# Patient Record
Sex: FEMALE | Race: WHITE | NOT HISPANIC OR LATINO | Employment: OTHER | URBAN - METROPOLITAN AREA
[De-identification: names, ages, dates, MRNs, and addresses within clinical notes are randomized per-mention and may not be internally consistent; named-entity substitution may affect disease eponyms.]

---

## 2018-08-17 ENCOUNTER — OFFICE VISIT (OUTPATIENT)
Dept: OPHTHALMOLOGY | Facility: CLINIC | Age: 73
End: 2018-08-17
Payer: MEDICARE

## 2018-08-17 DIAGNOSIS — H52.7 REFRACTION DISORDER: ICD-10-CM

## 2018-08-17 DIAGNOSIS — H25.011 CORTICAL SENILE CATARACT OF RIGHT EYE: ICD-10-CM

## 2018-08-17 DIAGNOSIS — H02.401 PTOSIS OF RIGHT EYELID: ICD-10-CM

## 2018-08-17 DIAGNOSIS — H11.153 PINGUECULA OF BOTH EYES: ICD-10-CM

## 2018-08-17 DIAGNOSIS — Z83.511 FAMILY HISTORY OF OPEN-ANGLE GLAUCOMA: ICD-10-CM

## 2018-08-17 DIAGNOSIS — H25.012 CORTICAL SENILE CATARACT OF LEFT EYE: Primary | ICD-10-CM

## 2018-08-17 PROCEDURE — 99999 PR PBB SHADOW E&M-NEW PATIENT-LVL II: CPT | Mod: PBBFAC,,, | Performed by: OPHTHALMOLOGY

## 2018-08-17 PROCEDURE — 92015 DETERMINE REFRACTIVE STATE: CPT | Mod: S$GLB,,, | Performed by: OPHTHALMOLOGY

## 2018-08-17 PROCEDURE — 92004 COMPRE OPH EXAM NEW PT 1/>: CPT | Mod: S$GLB,,, | Performed by: OPHTHALMOLOGY

## 2018-08-17 RX ORDER — LOVASTATIN 40 MG/1
40 TABLET ORAL NIGHTLY
COMMUNITY
Start: 2018-07-24

## 2018-08-17 RX ORDER — UBIDECARENONE 30 MG
30 CAPSULE ORAL 3 TIMES DAILY
COMMUNITY

## 2018-08-17 RX ORDER — OXYBUTYNIN CHLORIDE 5 MG/1
TABLET ORAL
COMMUNITY
Start: 2018-07-24

## 2018-08-17 RX ORDER — TRAZODONE HYDROCHLORIDE 50 MG/1
50 TABLET ORAL
COMMUNITY
Start: 2018-06-01

## 2018-08-17 RX ORDER — PANTOPRAZOLE SODIUM 40 MG/1
40 TABLET, DELAYED RELEASE ORAL
COMMUNITY
Start: 2018-06-01 | End: 2019-06-01

## 2018-08-17 RX ORDER — BENAZEPRIL/HYDROCHLOROTHIAZIDE 20 MG-25MG
1 TABLET ORAL
COMMUNITY
Start: 2018-06-01

## 2018-08-17 RX ORDER — MELOXICAM 15 MG/1
15 TABLET ORAL
COMMUNITY
Start: 2018-06-01 | End: 2019-06-01

## 2018-08-17 NOTE — PROGRESS NOTES
HPI     Patient is here to establish care , patient's last eye exam was approx.   4-5 years ago, patient c/o droopy lids interfere with her vision and   patient states she has great difficulty reading due to blurred vision,   patiet states she only drives at night if she has to due to difficulty   with oncoming headlight glare.        NP- REFERRED BY MOM -RAND LAYTON CL # [id number]  +FAM HX-GLAUCOMA-MOM AND BRO    Last edited by ILSA Delgado on 8/17/2018  9:51 AM. (History)            Assessment /Plan     For exam results, see Encounter Report.      ICD-10-CM ICD-9-CM    1. Cortical senile cataract of left eye H25.012 366.15   You were found to have an early cataract in your eye(s) today, however the cataract is not affecting your activities of daily living, such as reading and driving.You do not need  surgery at this time. We will recheck your cataract at your next visit. You are welcome to call for an earlier appointment if your vision gets worse.      2. Cortical senile cataract of right eye H25.011 366.15 Above    3. Pinguecula of both eyes H11.153 372.51 Stable, will follow   4. Family history of open-angle glaucoma Z83.511 V19.11 Normal GOCT    5. 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 Ptosis-right eyelid. Follow     Return to clinic 1 year

## 2019-08-23 ENCOUNTER — OFFICE VISIT (OUTPATIENT)
Dept: OPHTHALMOLOGY | Facility: CLINIC | Age: 74
End: 2019-08-23
Payer: MEDICARE

## 2019-08-23 DIAGNOSIS — Z83.511 FAMILY HISTORY OF OPEN-ANGLE GLAUCOMA: ICD-10-CM

## 2019-08-23 DIAGNOSIS — H25.011 CORTICAL SENILE CATARACT OF RIGHT EYE: ICD-10-CM

## 2019-08-23 DIAGNOSIS — H11.153 PINGUECULA OF BOTH EYES: ICD-10-CM

## 2019-08-23 DIAGNOSIS — H25.012 CORTICAL SENILE CATARACT OF LEFT EYE: Primary | ICD-10-CM

## 2019-08-23 DIAGNOSIS — H02.401 PTOSIS OF RIGHT EYELID: ICD-10-CM

## 2019-08-23 PROCEDURE — 99999 PR PBB SHADOW E&M-EST. PATIENT-LVL I: ICD-10-PCS | Mod: PBBFAC,,, | Performed by: OPHTHALMOLOGY

## 2019-08-23 PROCEDURE — 99999 PR PBB SHADOW E&M-EST. PATIENT-LVL I: CPT | Mod: PBBFAC,,, | Performed by: OPHTHALMOLOGY

## 2019-08-23 PROCEDURE — 92014 PR EYE EXAM, EST PATIENT,COMPREHESV: ICD-10-PCS | Mod: S$GLB,,, | Performed by: OPHTHALMOLOGY

## 2019-08-23 PROCEDURE — 92014 COMPRE OPH EXAM EST PT 1/>: CPT | Mod: S$GLB,,, | Performed by: OPHTHALMOLOGY

## 2019-08-23 RX ORDER — HYDROCORTISONE 25 MG/G
CREAM TOPICAL
COMMUNITY
Start: 2019-06-09

## 2019-08-23 RX ORDER — ONDANSETRON 4 MG/1
TABLET, FILM COATED ORAL
COMMUNITY
Start: 2018-06-21

## 2019-08-23 RX ORDER — ACETAMINOPHEN 500 MG
6000 TABLET ORAL
COMMUNITY

## 2019-08-23 RX ORDER — MELOXICAM 15 MG/1
15 TABLET ORAL
COMMUNITY
Start: 2019-07-01

## 2019-08-23 RX ORDER — BENZONATATE 100 MG/1
100 CAPSULE ORAL 3 TIMES DAILY PRN
COMMUNITY
Start: 2019-07-29

## 2019-08-23 RX ORDER — ASPIRIN 81 MG/1
81 TABLET ORAL
COMMUNITY
End: 2024-03-06

## 2019-08-23 RX ORDER — MULTIVITAMIN
1 TABLET ORAL
COMMUNITY

## 2019-08-23 RX ORDER — LOSARTAN POTASSIUM AND HYDROCHLOROTHIAZIDE 25; 100 MG/1; MG/1
1 TABLET ORAL
COMMUNITY
Start: 2019-07-01

## 2019-08-23 NOTE — PROGRESS NOTES
HPI     Pt here for annual exam. No pain or discomfort. VA stable. Pt states she   is happy with her glasses and doesn't need a new Rx.    NP- REFERRED BY MOM -RAND LAYTON CL # [id number]  +FAM HX-GLAUCOMA-MOM AND BRO    Last edited by Neil Kline, Patient Care Assistant on 8/23/2019 10:36   AM. (History)            Assessment /Plan     For exam results, see Encounter Report.      ICD-10-CM ICD-9-CM    1. Cortical senile cataract of left eye H25.012 366.15   You were found to have an early cataract in your eye(s) today, however the cataract is not affecting your activities of daily living, such as reading and driving.You do not need  surgery at this time. We will recheck your cataract at your next visit. You are welcome to call for an earlier appointment if your vision gets worse.      2. Cortical senile cataract of right eye H25.011 366.15 above   3. 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 Family history of open-angle glaucoma Z83.511 V19.11 Glaucoma risk level is unchanged at this time and patient will continued to be followed.      5. Ptosis of right eyelid H02.401 374.30 Stable, follow       Return to clinic 1 year

## 2020-12-11 ENCOUNTER — OFFICE VISIT (OUTPATIENT)
Dept: OPHTHALMOLOGY | Facility: CLINIC | Age: 75
End: 2020-12-11
Payer: MEDICARE

## 2020-12-11 DIAGNOSIS — H11.153 PINGUECULA OF BOTH EYES: ICD-10-CM

## 2020-12-11 DIAGNOSIS — H25.011 CORTICAL SENILE CATARACT OF RIGHT EYE: ICD-10-CM

## 2020-12-11 DIAGNOSIS — H02.401 PTOSIS OF RIGHT EYELID: ICD-10-CM

## 2020-12-11 DIAGNOSIS — H25.012 CORTICAL SENILE CATARACT OF LEFT EYE: Primary | ICD-10-CM

## 2020-12-11 DIAGNOSIS — H52.7 REFRACTION DISORDER: ICD-10-CM

## 2020-12-11 DIAGNOSIS — Z83.511 FAMILY HISTORY OF OPEN-ANGLE GLAUCOMA: ICD-10-CM

## 2020-12-11 PROCEDURE — 92014 COMPRE OPH EXAM EST PT 1/>: CPT | Mod: S$GLB,,, | Performed by: OPHTHALMOLOGY

## 2020-12-11 PROCEDURE — 92015 DETERMINE REFRACTIVE STATE: CPT | Mod: S$GLB,,, | Performed by: OPHTHALMOLOGY

## 2020-12-11 PROCEDURE — 92015 PR REFRACTION: ICD-10-PCS | Mod: S$GLB,,, | Performed by: OPHTHALMOLOGY

## 2020-12-11 PROCEDURE — 99999 PR PBB SHADOW E&M-EST. PATIENT-LVL III: ICD-10-PCS | Mod: PBBFAC,,, | Performed by: OPHTHALMOLOGY

## 2020-12-11 PROCEDURE — 99999 PR PBB SHADOW E&M-EST. PATIENT-LVL III: CPT | Mod: PBBFAC,,, | Performed by: OPHTHALMOLOGY

## 2020-12-11 PROCEDURE — 92014 PR EYE EXAM, EST PATIENT,COMPREHESV: ICD-10-PCS | Mod: S$GLB,,, | Performed by: OPHTHALMOLOGY

## 2020-12-11 NOTE — PROGRESS NOTES
HPI     Annual Exam     Comments: Complete ocular exam              Comments     Patient states her puppy got her most current glasses and is using an old   pair, believes her glasses were doing fine before they broke.      1. FAM HX-GLAUCOMA-MOM AND BRO  2. Cataract  3. Pinguecula  4. Ptosis          Last edited by Stephanie Wiseman, Patient Care Assistant on 12/11/2020 10:40   AM. (History)            Assessment /Plan     For exam results, see Encounter Report.      ICD-10-CM ICD-9-CM    1. Cortical senile cataract of left eye  H25.012 366.15   You were found to have an early cataract in your eye(s) today, however the cataract is not affecting your activities of daily living, such as reading and driving.You do not need  surgery at this time. We will recheck your cataract at your next visit. You are welcome to call for an earlier appointment if your vision gets worse.      2. Cortical senile cataract of right eye  H25.011 366.15 As above   3. Family history of open-angle glaucoma  Z83.511 V19.11 follow   4. Pinguecula of both eyes  H11.153 372.51    5. Ptosis of right eyelid  H02.401 374.30 follow       Disp Glasses  Return to clinic 1 year

## 2021-12-17 ENCOUNTER — OFFICE VISIT (OUTPATIENT)
Dept: OPHTHALMOLOGY | Facility: CLINIC | Age: 76
End: 2021-12-17
Payer: MEDICARE

## 2021-12-17 DIAGNOSIS — H11.153 PINGUECULA OF BOTH EYES: ICD-10-CM

## 2021-12-17 DIAGNOSIS — Z83.511 FAMILY HISTORY OF OPEN-ANGLE GLAUCOMA: ICD-10-CM

## 2021-12-17 DIAGNOSIS — H52.7 REFRACTIVE ERROR: ICD-10-CM

## 2021-12-17 DIAGNOSIS — H25.011 CORTICAL SENILE CATARACT OF RIGHT EYE: ICD-10-CM

## 2021-12-17 DIAGNOSIS — H25.012 CORTICAL SENILE CATARACT OF LEFT EYE: ICD-10-CM

## 2021-12-17 DIAGNOSIS — H40.013 OPEN ANGLE WITH BORDERLINE FINDINGS, LOW RISK, BILATERAL: Primary | ICD-10-CM

## 2021-12-17 PROCEDURE — 92014 COMPRE OPH EXAM EST PT 1/>: CPT | Mod: S$GLB,,, | Performed by: OPHTHALMOLOGY

## 2021-12-17 PROCEDURE — 99999 PR PBB SHADOW E&M-EST. PATIENT-LVL I: ICD-10-PCS | Mod: PBBFAC,,, | Performed by: OPHTHALMOLOGY

## 2021-12-17 PROCEDURE — 92014 PR EYE EXAM, EST PATIENT,COMPREHESV: ICD-10-PCS | Mod: S$GLB,,, | Performed by: OPHTHALMOLOGY

## 2021-12-17 PROCEDURE — 92015 PR REFRACTION: ICD-10-PCS | Mod: S$GLB,,, | Performed by: OPHTHALMOLOGY

## 2021-12-17 PROCEDURE — 92133 POSTERIOR SEGMENT OCT OPTIC NERVE(OCULAR COHERENCE TOMOGRAPHY) - OU - BOTH EYES: ICD-10-PCS | Mod: S$GLB,,, | Performed by: OPHTHALMOLOGY

## 2021-12-17 PROCEDURE — 92133 CPTRZD OPH DX IMG PST SGM ON: CPT | Mod: S$GLB,,, | Performed by: OPHTHALMOLOGY

## 2021-12-17 PROCEDURE — 92015 DETERMINE REFRACTIVE STATE: CPT | Mod: S$GLB,,, | Performed by: OPHTHALMOLOGY

## 2021-12-17 PROCEDURE — 99999 PR PBB SHADOW E&M-EST. PATIENT-LVL I: CPT | Mod: PBBFAC,,, | Performed by: OPHTHALMOLOGY

## 2023-01-23 ENCOUNTER — OFFICE VISIT (OUTPATIENT)
Dept: OPHTHALMOLOGY | Facility: CLINIC | Age: 78
End: 2023-01-23
Payer: MEDICARE

## 2023-01-23 DIAGNOSIS — H25.12 NUCLEAR SCLEROSIS OF LEFT EYE: ICD-10-CM

## 2023-01-23 DIAGNOSIS — H40.013 OPEN ANGLE WITH BORDERLINE FINDINGS, LOW RISK, BILATERAL: ICD-10-CM

## 2023-01-23 DIAGNOSIS — H52.7 REFRACTIVE ERROR: ICD-10-CM

## 2023-01-23 DIAGNOSIS — H25.11 NUCLEAR SCLEROSIS OF RIGHT EYE: Primary | ICD-10-CM

## 2023-01-23 DIAGNOSIS — H11.153 PINGUECULA OF BOTH EYES: ICD-10-CM

## 2023-01-23 PROCEDURE — 1160F RVW MEDS BY RX/DR IN RCRD: CPT | Mod: CPTII,S$GLB,, | Performed by: STUDENT IN AN ORGANIZED HEALTH CARE EDUCATION/TRAINING PROGRAM

## 2023-01-23 PROCEDURE — 1159F PR MEDICATION LIST DOCUMENTED IN MEDICAL RECORD: ICD-10-PCS | Mod: CPTII,S$GLB,, | Performed by: STUDENT IN AN ORGANIZED HEALTH CARE EDUCATION/TRAINING PROGRAM

## 2023-01-23 PROCEDURE — 99999 PR PBB SHADOW E&M-EST. PATIENT-LVL III: ICD-10-PCS | Mod: PBBFAC,,, | Performed by: STUDENT IN AN ORGANIZED HEALTH CARE EDUCATION/TRAINING PROGRAM

## 2023-01-23 PROCEDURE — 92015 DETERMINE REFRACTIVE STATE: CPT | Mod: S$GLB,,, | Performed by: STUDENT IN AN ORGANIZED HEALTH CARE EDUCATION/TRAINING PROGRAM

## 2023-01-23 PROCEDURE — 99999 PR PBB SHADOW E&M-EST. PATIENT-LVL III: CPT | Mod: PBBFAC,,, | Performed by: STUDENT IN AN ORGANIZED HEALTH CARE EDUCATION/TRAINING PROGRAM

## 2023-01-23 PROCEDURE — 1159F MED LIST DOCD IN RCRD: CPT | Mod: CPTII,S$GLB,, | Performed by: STUDENT IN AN ORGANIZED HEALTH CARE EDUCATION/TRAINING PROGRAM

## 2023-01-23 PROCEDURE — 99214 PR OFFICE/OUTPT VISIT, EST, LEVL IV, 30-39 MIN: ICD-10-PCS | Mod: S$GLB,,, | Performed by: STUDENT IN AN ORGANIZED HEALTH CARE EDUCATION/TRAINING PROGRAM

## 2023-01-23 PROCEDURE — 92133 POSTERIOR SEGMENT OCT OPTIC NERVE(OCULAR COHERENCE TOMOGRAPHY) - OU - BOTH EYES: ICD-10-PCS | Mod: S$GLB,,, | Performed by: STUDENT IN AN ORGANIZED HEALTH CARE EDUCATION/TRAINING PROGRAM

## 2023-01-23 PROCEDURE — 92015 PR REFRACTION: ICD-10-PCS | Mod: S$GLB,,, | Performed by: STUDENT IN AN ORGANIZED HEALTH CARE EDUCATION/TRAINING PROGRAM

## 2023-01-23 PROCEDURE — 99214 OFFICE O/P EST MOD 30 MIN: CPT | Mod: S$GLB,,, | Performed by: STUDENT IN AN ORGANIZED HEALTH CARE EDUCATION/TRAINING PROGRAM

## 2023-01-23 PROCEDURE — 92133 CPTRZD OPH DX IMG PST SGM ON: CPT | Mod: S$GLB,,, | Performed by: STUDENT IN AN ORGANIZED HEALTH CARE EDUCATION/TRAINING PROGRAM

## 2023-01-23 PROCEDURE — 1160F PR REVIEW ALL MEDS BY PRESCRIBER/CLIN PHARMACIST DOCUMENTED: ICD-10-PCS | Mod: CPTII,S$GLB,, | Performed by: STUDENT IN AN ORGANIZED HEALTH CARE EDUCATION/TRAINING PROGRAM

## 2023-01-23 NOTE — PROGRESS NOTES
HPI    Pt in today for a GOCT and dilation. Pt states her vision has been doing   ok. Pt denies any ocular pain or discomfort. Not using any drops at this   time.  Last edited by Cris Guajardo on 1/23/2023  1:28 PM.            Assessment /Plan     For exam results, see Encounter Report.    Nuclear sclerosis of right eye- - VS. Patient wishes to wait for surgery.  - monitor     Nuclear sclerosis of left eye    Open angle with borderline findings, low risk, bilateral  -  Risk factors: +Fhx  Will obtain GOCT annually    Explained that the diagnosis is uncertain and further testing is indicated. Discussed importance of follow up and completion of indicated studies as well as the risk of blindness from untreated/undiagnosed glaucoma.      Pinguecula of both eyes- Follow    Refractive error- Mrx dispensed      Return to clinic in 1 year or PRN w/ GOCT DFE

## 2023-06-15 ENCOUNTER — HOSPITAL ENCOUNTER (OUTPATIENT)
Dept: RADIOLOGY | Facility: HOSPITAL | Age: 78
Discharge: HOME | End: 2023-06-15
Payer: MEDICARE

## 2023-06-15 DIAGNOSIS — K62.3 RECTAL PROLAPSE: ICD-10-CM

## 2023-06-15 DIAGNOSIS — N81.89 OLD LACERATION OF MUSCLES OF PELVIC FLOOR: ICD-10-CM

## 2023-06-15 PROCEDURE — 74270 FL DEFECOGRAM: ICD-10-PCS | Mod: 26,,, | Performed by: STUDENT IN AN ORGANIZED HEALTH CARE EDUCATION/TRAINING PROGRAM

## 2023-06-15 PROCEDURE — 25500020 PHARM REV CODE 255

## 2023-06-15 PROCEDURE — 74270 X-RAY XM COLON 1CNTRST STD: CPT | Mod: TC

## 2023-06-15 PROCEDURE — 74270 X-RAY XM COLON 1CNTRST STD: CPT | Mod: 26,,, | Performed by: STUDENT IN AN ORGANIZED HEALTH CARE EDUCATION/TRAINING PROGRAM

## 2023-06-15 PROCEDURE — A9698 NON-RAD CONTRAST MATERIALNOC: HCPCS

## 2023-06-15 RX ADMIN — BARIUM SULFATE 60 ML: 0.6 SUSPENSION ORAL at 10:06

## 2023-06-15 RX ADMIN — BARIUM SULFATE 454 G: 0.6 CREAM ORAL at 10:06

## 2023-12-18 ENCOUNTER — TELEPHONE (OUTPATIENT)
Dept: OPHTHALMOLOGY | Facility: CLINIC | Age: 78
End: 2023-12-18
Payer: MEDICARE

## 2023-12-18 NOTE — TELEPHONE ENCOUNTER
Called pt, she would like to schedule appt for [place] with Dr. Blakely for her  yearly  Cataract recheck,  believes she is ready to schedule her surgery. Explained to the pt  I would get with Dr SABILLON /nurse staff to call her and get her scheduled for that location. SRB  ----- Message from Andra Browning sent at 12/18/2023  2:49 PM CST -----  [phone]   Kalyn is calling regarding future surgery for cataract would like to schedule cons

## 2023-12-18 NOTE — TELEPHONE ENCOUNTER
----- Message from Andra Browning sent at 12/18/2023  3:22 PM CST -----  Nurse staff schedule cataract cons   ----- Message -----  From: Kaila Hood  Sent: 12/18/2023   3:15 PM CST  To: [facility] Scheduling      ----- Message -----  From: Andra Browning  Sent: 12/18/2023   2:50 PM CST  To: [facility] Staff; #    [phone]   Kalyn is calling regarding future surgery for cataract would like to schedule cons

## 2023-12-20 ENCOUNTER — TELEPHONE (OUTPATIENT)
Dept: OPHTHALMOLOGY | Facility: CLINIC | Age: 78
End: 2023-12-20
Payer: MEDICARE

## 2024-01-24 ENCOUNTER — TELEPHONE (OUTPATIENT)
Dept: OPHTHALMOLOGY | Facility: CLINIC | Age: 79
End: 2024-01-24
Payer: MEDICARE

## 2024-01-30 ENCOUNTER — OFFICE VISIT (OUTPATIENT)
Dept: OPHTHALMOLOGY | Facility: CLINIC | Age: 79
End: 2024-01-30
Payer: MEDICARE

## 2024-01-30 DIAGNOSIS — H11.153 PINGUECULA OF BOTH EYES: ICD-10-CM

## 2024-01-30 DIAGNOSIS — H40.013 OPEN ANGLE WITH BORDERLINE FINDINGS, LOW RISK, BILATERAL: ICD-10-CM

## 2024-01-30 DIAGNOSIS — H25.813 COMBINED FORM OF AGE-RELATED CATARACT, BOTH EYES: Primary | ICD-10-CM

## 2024-01-30 PROCEDURE — 92133 CPTRZD OPH DX IMG PST SGM ON: CPT | Mod: S$GLB,,, | Performed by: OPTOMETRIST

## 2024-01-30 PROCEDURE — 99999 PR PBB SHADOW E&M-EST. PATIENT-LVL III: CPT | Mod: PBBFAC,,, | Performed by: OPTOMETRIST

## 2024-01-30 PROCEDURE — 92004 COMPRE OPH EXAM NEW PT 1/>: CPT | Mod: S$GLB,,, | Performed by: OPTOMETRIST

## 2024-01-30 PROCEDURE — 1159F MED LIST DOCD IN RCRD: CPT | Mod: CPTII,S$GLB,, | Performed by: OPTOMETRIST

## 2024-01-30 NOTE — PROGRESS NOTES
HPI    1.NS OU  2. OAG findings, low risk  REFERRED BY KATT -RAND LAYTON CL # [id number].   3. FAM HX-GLAUCOMA-MOM AND BRO  4. Cataract  5. Pinguecula  6. Ptosis    Vision changes since last eye exam?: Pt is experiencing worsening near   vision when reading.      Any eye pain today: No.    Other ocular symptoms: Pt experiences occasional headaches. Photophobia.    Interested in contact lens fitting today? No.     Last edited by Lia Alonzo on 1/30/2024  2:33 PM.            Assessment /Plan     For exam results, see Encounter Report.    Combined form of age-related cataract, both eyes  -     Ambulatory referral/consult to Ophthalmology; Future; Expected date: 02/06/2024  Will consult Dr Mahoney for CE/IOL eval.     Open angle with borderline findings, low risk, bilateral  -     OCT, Optic Nerve - OU - Both Eyes  The patient has the following Glaucoma risk factors: Optic Nerve Asymmetry  and Family History    Recommend close observation off drops at this time with annual gOCT for changes.      Pinguecula of both eyes  Stable, observe.    RTC with Dr. Mahoney for CE/IOL, sooner if changes to vision or worsening symptoms.  Discussed above and answered questions.

## 2024-03-06 ENCOUNTER — OFFICE VISIT (OUTPATIENT)
Dept: OPHTHALMOLOGY | Facility: CLINIC | Age: 79
End: 2024-03-06
Payer: MEDICARE

## 2024-03-06 DIAGNOSIS — H40.013 OPEN ANGLE WITH BORDERLINE FINDINGS, LOW RISK, BILATERAL: ICD-10-CM

## 2024-03-06 DIAGNOSIS — H25.813 COMBINED FORM OF AGE-RELATED CATARACT, BOTH EYES: ICD-10-CM

## 2024-03-06 DIAGNOSIS — H25.12 NUCLEAR SCLEROSIS OF LEFT EYE: Primary | ICD-10-CM

## 2024-03-06 DIAGNOSIS — H25.11 NUCLEAR SCLEROSIS OF RIGHT EYE: ICD-10-CM

## 2024-03-06 DIAGNOSIS — H11.153 PINGUECULA OF BOTH EYES: ICD-10-CM

## 2024-03-06 PROCEDURE — 99214 OFFICE O/P EST MOD 30 MIN: CPT | Mod: S$GLB,,, | Performed by: OPHTHALMOLOGY

## 2024-03-06 PROCEDURE — 92136 OPHTHALMIC BIOMETRY: CPT | Mod: LT,S$GLB,, | Performed by: OPHTHALMOLOGY

## 2024-03-06 PROCEDURE — 99999 PR PBB SHADOW E&M-EST. PATIENT-LVL IV: CPT | Mod: PBBFAC,,, | Performed by: OPHTHALMOLOGY

## 2024-03-06 PROCEDURE — 1159F MED LIST DOCD IN RCRD: CPT | Mod: CPTII,S$GLB,, | Performed by: OPHTHALMOLOGY

## 2024-03-06 PROCEDURE — 1160F RVW MEDS BY RX/DR IN RCRD: CPT | Mod: CPTII,S$GLB,, | Performed by: OPHTHALMOLOGY

## 2024-03-06 RX ORDER — PREDNISOLONE ACETATE 10 MG/ML
1 SUSPENSION/ DROPS OPHTHALMIC 4 TIMES DAILY
Qty: 5 ML | Refills: 1 | Status: SHIPPED | OUTPATIENT
Start: 2024-03-06

## 2024-03-06 NOTE — PROGRESS NOTES
HPI     Cataract            Comments: Cataract eval referred by DKT    Patient states VA has decreased at all ranges OS>OD over the last 6   months, images are very hazy and dull and patient no longer drives at   night secondary to headlight glare becoming bothersome.          Comments    1. FAM HX-GLAUCOMA-MOM AND BRO  2. Cataract  3. Pinguecula  4. Ptosis             Last edited by Stephanie Wiseman, Patient Care Assistant on 3/6/2024  8:56   AM.            Assessment /Plan     For exam results, see Encounter Report.      ICD-10-CM ICD-9-CM    1. Nuclear sclerosis of left eye  H25.12 366.16 Visually Significant Cataract OS > OD  Patient reports decreased vision in the fellow eye consistent with the clinical amount of lenticular opacity, which reaches the level of visual significance and affects activities of daily living including reading and glare. Risks, benefits, and alternatives to cataract surgery were discussed and patient desired to schedule cataract surgery. Patient was consented and the biometry and lens options were reviewed.    Discussed IOL options and refractive outcomes for this patient.    Topography Reviewed: Yes  History of Refractive Surgery: No     Phaco left eye, +24.0 SY60WF  Block  Monofocal - Distance  Prescriptions sent for preoperative medications  Prednisolone Acetate- 4xs daily   Anticoagulant status: None      Explained that patient may need glasses after surgery.  Discussed that vision may be limited by: None      Referral to [facility] for Ophthalmic surgery      **defers MF implant**      Schedule post op visit #2 with MGM      2. Nuclear sclerosis of right eye  H25.11 366.16 Monitor at this time      3. Open angle with borderline findings, low risk, bilateral  H40.013 365.01 Glaucoma risk level is unchanged at this time and patient will continued to be followed.       4. Pinguecula of both eyes  H11.153 372.51 Not in visual axis       5. Combined form of age-related  cataract, both eyes  H25.813 366.19 Ambulatory referral/consult to Ophthalmology    See #1

## 2024-03-08 ENCOUNTER — DOCUMENTATION ONLY (OUTPATIENT)
Dept: OPHTHALMOLOGY | Facility: CLINIC | Age: 79
End: 2024-03-08
Payer: MEDICARE

## 2024-03-08 NOTE — PROGRESS NOTES
Short Stay Record    CC: Blurry Vision     Impression: Visually significant cataract with reasonable expectation for visual improvement Left Eye    External Exam  Last IOP 13/12   Right Left   External Ptosis, dermatochalasis Ptosis, dermatochalasis     Slit Lamp Exam     Right Left   Lids/Lashes Normal Normal   Conjunctiva/Sclera Temporal and Nasal 1+ Pinguecula Temporal and Nasal 1+ Pinguecula   Cornea Clear Clear   Anterior Chamber Deep and quiet Deep and quiet   Iris Round and reactive Round and reactive   Lens 1+ Nuclear sclerosis, 2+ Cortical cataract 1+ Nuclear sclerosis, 3+ Cortical cataract   Vitreous Normal Normal     Fundus Exam     Right Left   Disc Normal Normal   C/D Ratio 0.35 0.45   Macula Normal Normal   Vessels Normal Normal   Periphery Normal Normal       Manifest Refraction     Sphere Cylinder [place] Dist VA   Right +1.25 +1.75 094 20/30   Left +2.25 +1.00 120 20/50       Planned Procedure:   Topography Reviewed: Yes  History of Refractive Surgery: No      Phaco left eye, +24.0 SY60WF  Block  Monofocal - Distance  Prescriptions sent for preoperative medications  Prednisolone Acetate- 4xs daily   Anticoagulant status: None          Lens Selection OS verified _____             Previous IOL OD _n/a____    Patient cleared for ophthalmic surgery.     Discharge Summary:    Admitting Diagnosis: Nuclear sclerotic cataract /   OS    Discharge Diagnosis: Pseudophakia OS    Procedure: Phacoemulsification of cataract with intraocular lens implant OS    Complications: None  Discharge Condition: Stable    Discharge instructions: Follow post-operative discharge instruction sheet given by provider.    Follow-up: Return to clinic next day or as scheduled.       HPI:  Kalyn Jacob is a 78 y.o. female who presents for evaluation prior to ophthalmic surgery, left eye. Patient reports of blurred vision at distance and near with and without correction. Patient reports of glare at night reducing function and safety,  and complains of needed additional light to read.     Past Medical History:   Diagnosis Date    Arthritis     Cataract     Hypertension          Review of patient's allergies indicates:   Allergen Reactions    Atorvastatin     Diphenhydramine hcl     Morphine      Nightmares, can take hydrocodone    Penicillins     Rosuvastatin          Current Outpatient Medications:     benazepril-hydrochlorthiazide (LOTENSIN HCT) 20-25 mg Tab, Take 1 tablet by mouth., Disp: , Rfl:     benzonatate (TESSALON) 100 MG capsule, Take 100 mg by mouth 3 (three) times daily as needed., Disp: , Rfl:     cholecalciferol, vitamin D3, (VITAMIN D3) 2,000 unit Cap, Take 6,000 mg by mouth., Disp: , Rfl:     co-enzyme Q-10 30 mg capsule, Take 30 mg by mouth 3 (three) times daily., Disp: , Rfl:     hydrocortisone (ANUSOL-HC) 2.5 % rectal cream, Place rectally., Disp: , Rfl:     losartan-hydrochlorothiazide 100-25 mg (HYZAAR) 100-25 mg per tablet, Take 1 tablet by mouth., Disp: , Rfl:     lovastatin (MEVACOR) 40 MG tablet, 40 mg every evening., Disp: , Rfl:     meloxicam (MOBIC) 15 MG tablet, Take 15 mg by mouth., Disp: , Rfl:     multivitamin (THERAGRAN) per tablet, Take 1 tablet by mouth., Disp: , Rfl:     ondansetron (ZOFRAN) 4 MG tablet, TAKE 1 TABLET BY MOUTH DAILY AS NEEDED FOR NAUSEA., Disp: , Rfl:     oxybutynin (DITROPAN) 5 MG Tab, TAKE 1 TABLET THREE TIMES DAILY. REPLACES MYRBETRIQ ER 50MG, Disp: , Rfl:     pantoprazole (PROTONIX) 40 MG tablet, Take 40 mg by mouth., Disp: , Rfl:     prednisoLONE acetate (PRED FORTE) 1 % DrpS, Place 1 drop into the left eye 4 (four) times daily., Disp: 5 mL, Rfl: 1    traZODone (DESYREL) 50 MG tablet, Take 50 mg by mouth., Disp: , Rfl:     Review of Systems:  A comprehensive review of systems was negative.    Physical Exam:  General Appearance:    A&Ox3, no distress, appears stated age   Head:    Normocephalic, without obvious abnormality, atraumatic   Eyes:    PERRL, EOM's intact   Back:     Symmetric, no  curvature   Lungs:     Respirations unlabored   Chest Wall:    No tenderness or deformity    Heart:  Abdomen:  Extremities:  Skin:    S1 and S2 present    Soft, non-tender    Extremities normal, atraumatic    Skin color, texture, turgor normal

## 2024-03-25 ENCOUNTER — OUTSIDE PLACE OF SERVICE (OUTPATIENT)
Dept: OPHTHALMOLOGY | Facility: CLINIC | Age: 79
End: 2024-03-25
Payer: MEDICARE

## 2024-03-25 PROCEDURE — 66984 XCAPSL CTRC RMVL W/O ECP: CPT | Mod: LT,,, | Performed by: OPHTHALMOLOGY

## 2024-03-26 ENCOUNTER — OFFICE VISIT (OUTPATIENT)
Dept: OPHTHALMOLOGY | Facility: CLINIC | Age: 79
End: 2024-03-26
Payer: MEDICARE

## 2024-03-26 DIAGNOSIS — Z98.890 POST-OPERATIVE STATE: Primary | ICD-10-CM

## 2024-03-26 DIAGNOSIS — Z98.42 CATARACT EXTRACTION STATUS, LEFT: ICD-10-CM

## 2024-03-26 PROCEDURE — 1159F MED LIST DOCD IN RCRD: CPT | Mod: CPTII,S$GLB,, | Performed by: OPHTHALMOLOGY

## 2024-03-26 PROCEDURE — 1160F RVW MEDS BY RX/DR IN RCRD: CPT | Mod: CPTII,S$GLB,, | Performed by: OPHTHALMOLOGY

## 2024-03-26 PROCEDURE — 99999 PR PBB SHADOW E&M-EST. PATIENT-LVL II: CPT | Mod: PBBFAC,,, | Performed by: OPHTHALMOLOGY

## 2024-03-26 PROCEDURE — 99024 POSTOP FOLLOW-UP VISIT: CPT | Mod: S$GLB,,, | Performed by: OPHTHALMOLOGY

## 2024-03-26 NOTE — PROGRESS NOTES
HPI     Post-op Evaluation            Comments: One day post Phaco OS  Pain 0/10         Last edited by Marcio Mahoney MD on 3/26/2024  8:32 AM.            Assessment /Plan     For exam results, see Encounter Report.      ICD-10-CM ICD-9-CM    1. Post-operative state  Z98.890 V45.89       2. Cataract extraction status, left  Z98.42 V45.61           PO Day 1 S/P Phaco/IOL left eye  Doing well.    Use Prednisolone Acetate- 4xs daily      Reinstructed in importance of absolute compliance with Post-OP instructions including medications, shield at bedtime, and limitation of activities. Follow up appointments in approximately one and six weeks or call immediately for increased pain, redness or vision loss.

## 2024-04-02 ENCOUNTER — OFFICE VISIT (OUTPATIENT)
Dept: OPHTHALMOLOGY | Facility: CLINIC | Age: 79
End: 2024-04-02
Payer: MEDICARE

## 2024-04-02 DIAGNOSIS — Z98.890 POST-OPERATIVE STATE: Primary | ICD-10-CM

## 2024-04-02 DIAGNOSIS — Z98.42 CATARACT EXTRACTION STATUS, LEFT: ICD-10-CM

## 2024-04-02 DIAGNOSIS — H25.11 NUCLEAR SCLEROSIS OF RIGHT EYE: ICD-10-CM

## 2024-04-02 PROCEDURE — 92136 OPHTHALMIC BIOMETRY: CPT | Mod: 26,RT,S$GLB, | Performed by: OPHTHALMOLOGY

## 2024-04-02 PROCEDURE — 1160F RVW MEDS BY RX/DR IN RCRD: CPT | Mod: CPTII,S$GLB,, | Performed by: OPHTHALMOLOGY

## 2024-04-02 PROCEDURE — 99024 POSTOP FOLLOW-UP VISIT: CPT | Mod: S$GLB,,, | Performed by: OPHTHALMOLOGY

## 2024-04-02 PROCEDURE — 99999 PR PBB SHADOW E&M-EST. PATIENT-LVL III: CPT | Mod: PBBFAC,,, | Performed by: OPHTHALMOLOGY

## 2024-04-02 PROCEDURE — 1159F MED LIST DOCD IN RCRD: CPT | Mod: CPTII,S$GLB,, | Performed by: OPHTHALMOLOGY

## 2024-04-02 RX ORDER — PREDNISOLONE ACETATE 10 MG/ML
1 SUSPENSION/ DROPS OPHTHALMIC 4 TIMES DAILY
Qty: 5 ML | Refills: 1 | Status: SHIPPED | OUTPATIENT
Start: 2024-04-02

## 2024-04-02 NOTE — PROGRESS NOTES
HPI     Post-op Evaluation            Comments: PCIOL OS 3/25/24 SY60WF +24.0/ CDE: 12.28    Patient states VA is doing well in OS happy with surgical results.    Patient states VA in OD appears dull and cloudy compared to OS along with   glare sensitivity.            Comments    1. FAM HX-GLAUCOMA-MOM AND BRO  2. PCIOL OS 3/25/24 SY60WF +24.0/ CDE: 12.28  3. Pinguecula OU  4. Ptosis  5. Glaucoma suspect      Pred QID OS             Last edited by Stephanie Wiseman, Patient Care Assistant on 4/2/2024  9:21   AM.            Assessment /Plan     For exam results, see Encounter Report.      ICD-10-CM ICD-9-CM    1. Post-operative state  Z98.890 V45.89 PO Week 1 S/P Phaco/ IOL left eye:     Patient is doing well with no evidence of inflammation. Taper drops every 10 days as follows: 3xs daily x 10 days, 2xs daily x 10 days, 1x daily for 10 days- then discontinue    Resume normal activitites and discontinue eye shield at night  OTC reading glasses can be used until evaluated for final MRx      2. Cataract extraction status, left  Z98.42 V45.61       3. Nuclear sclerosis of right eye  H25.11 366.16 Patient reports decreased vision in the fellow eye consistent with the clinical amount of lenticular opacity, which reaches the level of visual significance and affects activities of daily living including reading and glare. Risks, benefits, and alternatives to cataract surgery were discussed and patient desired to schedule cataract surgery. Patient was consented and the biometry and lens options were reviewed.    Topography Reviewed: Yes  History of Refractive Surgery: No     Phaco right +24.5 SY60WF  Block   Requests a Monofocal - Distance IOL.  Patient given prescriptions for Prednisolone Acetate- 4xs daily   Anticoagulant status None    Explained that patient may need glasses after surgery.  Discussed that vision may be limited by: None      Schedule post op visit #2 with DKT           Return to clinic 1 week PO with DKT to  resume care

## 2024-04-05 ENCOUNTER — DOCUMENTATION ONLY (OUTPATIENT)
Dept: OPHTHALMOLOGY | Facility: CLINIC | Age: 79
End: 2024-04-05
Payer: MEDICARE

## 2024-04-05 NOTE — PROGRESS NOTES
Short Stay Record    CC: Blurry Vision     Impression: Visually significant cataract with reasonable expectation for visual improvement Right Eye    Base Eye Exam    Visual Acuity (Snellen - Linear)     Right Left   Dist sc  20/20   Dist cc 20/40    Correction: Glasses     Tonometry (Applanation, 9:25 AM)     Right Left   Pressure 10 09    Neuro/Psych    Oriented x3: Yes   Mood/Affect: Normal      Edited by: Stephanie Wiseman, Patient Care Assistant        Additional Tests      Glare Testing (Room Lighting)     Medium   Right 20/150   Left    Edited by: Stephanie Wiseman, Patient Care Assistant        Slit Lamp and Fundus Exam      External Exam     Right Left   External Ptosis, dermatochalasis Ptosis, dermatochalasis     Slit Lamp Exam     Right Left   Lids/Lashes Normal Normal   Conjunctiva/Sclera Temporal and Nasal 1+ Pinguecula Temporal and Nasal 1+ Pinguecula   Cornea Clear Clear   Anterior Chamber Deep and quiet Deep and quiet   Iris Round and reactive Round and reactive   Lens 1+ Nuclear sclerosis, 2+ Cortical cataract Posterior chamber intraocular lens   Vitreous Normal Normal   Edited by: Marcio Mahoney MD   Fundus Exam     Right Left   Disc Normal Normal   C/D Ratio 0.35 0.45   Macula Normal Normal   Vessels Normal Normal   Periphery Normal Normal   Edited by: Marcio Mahoney MD        Refraction      Manifest Refraction     Sphere Cylinder Ord Dist VA   Right +1.25 +1.75 094 20/30   Left [place] Sphere  20/20   Edited by: Stephanie Wiseman Patient Care Assistant       Planned Procedure:   Topography Reviewed: Yes  History of Refractive Surgery: No      Phaco right +24.5 SY60WF  Block   Requests a Monofocal - Distance IOL.  Patient given prescriptions for Prednisolone Acetate- 4xs daily   Anticoagulant status None          Lens Selection OD verified _____           Previous IOL OS +24.0 SY60WF    Patient cleared for ophthalmic surgery.     Discharge Summary:    Admitting Diagnosis: Nuclear Sclerosis  OD    Discharge Diagnosis: Pseudophakia OD    Procedure: Phacoemulsification of cataract with intraocular lens implant OD    Complications: None  Discharge Condition: Stable    Discharge instructions: Follow post-operative discharge instruction sheet given by provider.    Follow-up: Return to clinic next day or as scheduled.       HPI:  Kalyn Jacob is a 78 y.o. female who presents for evaluation prior to ophthalmic surgery, left eye. Patient reports of blurred vision at distance and near with and without correction. Patient reports of glare at night reducing function and safety, and complains of needed additional light to read.     Past Medical History:   Diagnosis Date    Arthritis     Cataract     Hypertension      No past surgical history on file.  Review of patient's allergies indicates:   Allergen Reactions    Atorvastatin     Diphenhydramine hcl     Morphine      Nightmares, can take hydrocodone    Penicillins     Rosuvastatin        Current Outpatient Medications:     benazepril-hydrochlorthiazide (LOTENSIN HCT) 20-25 mg Tab, Take 1 tablet by mouth., Disp: , Rfl:     benzonatate (TESSALON) 100 MG capsule, Take 100 mg by mouth 3 (three) times daily as needed., Disp: , Rfl:     cholecalciferol, vitamin D3, (VITAMIN D3) 2,000 unit Cap, Take 6,000 mg by mouth., Disp: , Rfl:     co-enzyme Q-10 30 mg capsule, Take 30 mg by mouth 3 (three) times daily., Disp: , Rfl:     hydrocortisone (ANUSOL-HC) 2.5 % rectal cream, Place rectally., Disp: , Rfl:     losartan-hydrochlorothiazide 100-25 mg (HYZAAR) 100-25 mg per tablet, Take 1 tablet by mouth., Disp: , Rfl:     lovastatin (MEVACOR) 40 MG tablet, 40 mg every evening., Disp: , Rfl:     meloxicam (MOBIC) 15 MG tablet, Take 15 mg by mouth., Disp: , Rfl:     multivitamin (THERAGRAN) per tablet, Take 1 tablet by mouth., Disp: , Rfl:     ondansetron (ZOFRAN) 4 MG tablet, TAKE 1 TABLET BY MOUTH DAILY AS NEEDED FOR NAUSEA., Disp: , Rfl:     oxybutynin (DITROPAN) 5 MG  Tab, TAKE 1 TABLET THREE TIMES DAILY. REPLACES MYRBETRIQ ER 50MG, Disp: , Rfl:     pantoprazole (PROTONIX) 40 MG tablet, Take 40 mg by mouth., Disp: , Rfl:     prednisoLONE acetate (PRED FORTE) 1 % DrpS, Place 1 drop into the left eye 4 (four) times daily., Disp: 5 mL, Rfl: 1    prednisoLONE acetate (PRED FORTE) 1 % DrpS, Place 1 drop into the right eye 4 (four) times daily., Disp: 5 mL, Rfl: 1    traZODone (DESYREL) 50 MG tablet, Take 50 mg by mouth., Disp: , Rfl:     Review of Systems:  A comprehensive review of systems was negative.    Physical Exam:  General Appearance:    A&Ox3, no distress, appears stated age   Head:    Normocephalic, without obvious abnormality, atraumatic   Eyes:    PERRL, EOM's intact   Back:     Symmetric, no curvature   Lungs:     Respirations unlabored   Chest Wall:    No tenderness or deformity    Heart:  Abdomen:  Extremities:  Skin:    S1 and S2 present    Soft, non-tender    Extremities normal, atraumatic    Skin color, texture, turgor normal

## 2024-04-18 ENCOUNTER — OUTSIDE PLACE OF SERVICE (OUTPATIENT)
Dept: OPHTHALMOLOGY | Facility: CLINIC | Age: 79
End: 2024-04-18
Payer: MEDICARE

## 2024-04-18 PROCEDURE — 66984 XCAPSL CTRC RMVL W/O ECP: CPT | Mod: 79,RT,, | Performed by: OPHTHALMOLOGY

## 2024-04-19 ENCOUNTER — OFFICE VISIT (OUTPATIENT)
Dept: OPHTHALMOLOGY | Facility: CLINIC | Age: 79
End: 2024-04-19
Payer: MEDICARE

## 2024-04-19 DIAGNOSIS — Z98.41 CATARACT EXTRACTION STATUS, RIGHT: ICD-10-CM

## 2024-04-19 DIAGNOSIS — Z98.890 POST-OPERATIVE STATE: Primary | ICD-10-CM

## 2024-04-19 PROCEDURE — 1159F MED LIST DOCD IN RCRD: CPT | Mod: CPTII,S$GLB,, | Performed by: OPHTHALMOLOGY

## 2024-04-19 PROCEDURE — 99999 PR PBB SHADOW E&M-EST. PATIENT-LVL III: CPT | Mod: PBBFAC,,, | Performed by: OPHTHALMOLOGY

## 2024-04-19 PROCEDURE — 1160F RVW MEDS BY RX/DR IN RCRD: CPT | Mod: CPTII,S$GLB,, | Performed by: OPHTHALMOLOGY

## 2024-04-19 PROCEDURE — 99024 POSTOP FOLLOW-UP VISIT: CPT | Mod: S$GLB,,, | Performed by: OPHTHALMOLOGY

## 2024-04-19 NOTE — PROGRESS NOTES
HPI     Post-op Evaluation            Comments: PCIOL OD 4/18/24 SY60WF +24.5/CDE: 10.60    Patient states some soreness last night but feels much better today.            Comments    1. FAM HX-GLAUCOMA-MOM AND BRO  2. PCIOL OS 3/25/24 SY60WF +24.0/ CDE: 12.28  PCIOL OD 4/18/24 SY60WF +24.5/CDE: 10.60  3. Pinguecula OU  4. Ptosis  5. Glaucoma suspect      Pred QID OS  Pred QID OD             Last edited by Stephanie Wiseman, Patient Care Assistant on 4/19/2024 10:06   AM.            Assessment /Plan     For exam results, see Encounter Report.      ICD-10-CM ICD-9-CM    1. Post-operative state  Z98.890 V45.89       2. Cataract extraction status, right  Z98.41 V45.61           PO Day 1 S/P Phaco/IOL right eye  Doing well.    Use Prednisolone Acetate- 4xs daily      Reinstructed in importance of absolute compliance with Post-OP instructions including medications, shield at bedtime, and limitation of activities. Follow up appointments in approximately one and six weeks or call immediately for increased pain, redness or vision loss.

## 2024-04-24 ENCOUNTER — OFFICE VISIT (OUTPATIENT)
Dept: OPHTHALMOLOGY | Facility: CLINIC | Age: 79
End: 2024-04-24
Payer: MEDICARE

## 2024-04-24 DIAGNOSIS — Z98.890 POST-OPERATIVE STATE: Primary | ICD-10-CM

## 2024-04-24 PROCEDURE — 99999 PR PBB SHADOW E&M-EST. PATIENT-LVL II: CPT | Mod: PBBFAC,,, | Performed by: OPTOMETRIST

## 2024-04-24 PROCEDURE — 99024 POSTOP FOLLOW-UP VISIT: CPT | Mod: S$GLB,,, | Performed by: OPTOMETRIST

## 2024-04-24 PROCEDURE — 1159F MED LIST DOCD IN RCRD: CPT | Mod: CPTII,S$GLB,, | Performed by: OPTOMETRIST

## 2024-04-24 NOTE — PROGRESS NOTES
HPI     Post-op Evaluation            Comments: Patient here today for 1 week post op OD  C/O mild irritation   Using drops as directed  Pain above OD  Pain rated at 1-2            Comments    1. FAM HX-GLAUCOMA-MOM AND BRO  2. PCIOL OS 3/25/24 SY60WF +24.0/ CDE: 12.28  PCIOL OD 4/18/24 SY60WF +24.5/CDE: 10.60  3. Pinguecula OU  4. Ptosis    Pred QID OS  Pred QID OD     5. Glaucoma suspect            Last edited by Cristal Arroyo, PCT on 4/24/2024  2:02 PM.            Assessment /Plan     For exam results, see Encounter Report.    Post-operative state    Impression/Plan  POW#1 S/P CEIOL OD : Doing well with no evidence of infection.     Continue gtts to operative eye:   OD Trace Cell. PF Taper 4-3-2-1 then stop    Pt given and instructed in one week postop instructions. Can resume normal activitites and d/c eye shield. OTC reading glasses can be used until evaluated for final MR.     RTC 3 weeks for POM #1 OD, sooner if any changes to vision or worsening symptoms.

## 2024-05-15 ENCOUNTER — OFFICE VISIT (OUTPATIENT)
Dept: OPHTHALMOLOGY | Facility: CLINIC | Age: 79
End: 2024-05-15
Payer: MEDICARE

## 2024-05-15 DIAGNOSIS — Z98.890 POST-OPERATIVE STATE: Primary | ICD-10-CM

## 2024-05-15 PROCEDURE — 1159F MED LIST DOCD IN RCRD: CPT | Mod: CPTII,S$GLB,, | Performed by: OPTOMETRIST

## 2024-05-15 PROCEDURE — 99999 PR PBB SHADOW E&M-EST. PATIENT-LVL II: CPT | Mod: PBBFAC,,, | Performed by: OPTOMETRIST

## 2024-05-15 PROCEDURE — 99024 POSTOP FOLLOW-UP VISIT: CPT | Mod: S$GLB,,, | Performed by: OPTOMETRIST

## 2024-05-15 NOTE — PROGRESS NOTES
HPI     Post-op Evaluation            Comments: Patient here today for 1 month post op   Vision stable  C/O mild pain sometimes          Comments    1. FAM HX-GLAUCOMA-MOM AND BRO  2. PCIOL OS 3/25/24 SY60WF +24.0/ CDE: 12.28  PCIOL OD 4/18/24 SY60WF +24.5/CDE: 10.60  3. Pinguecula OU  4. Ptosis       Pred QD OD      5. Glaucoma suspect            Last edited by Ryan Doyel, OD on 5/15/2024  2:00 PM.            Assessment /Plan     For exam results, see Encounter Report.    Post-operative state      Assessment:    S/P CEIOL OD : Doing Well and completing healing process. Final visual correction options discussed and appropriate prescriptions and/or OTC reading glass recommendations offered to patient. Mrx offered. Pt instructed to follow up in 6 months for next eye exam or PRN any pain, redness, vision changes or other concerns.     RTC in 3 months for DFE, sooner if changes to vision or worsening symptoms.  Discussed above and answered questions.